# Patient Record
Sex: FEMALE | Race: WHITE
[De-identification: names, ages, dates, MRNs, and addresses within clinical notes are randomized per-mention and may not be internally consistent; named-entity substitution may affect disease eponyms.]

---

## 2022-09-05 ENCOUNTER — HOSPITAL ENCOUNTER (EMERGENCY)
Dept: HOSPITAL 33 - ED | Age: 67
Discharge: HOME | End: 2022-09-05
Payer: COMMERCIAL

## 2022-09-05 VITALS — DIASTOLIC BLOOD PRESSURE: 59 MMHG | OXYGEN SATURATION: 97 % | HEART RATE: 81 BPM | SYSTOLIC BLOOD PRESSURE: 133 MMHG

## 2022-09-05 DIAGNOSIS — E11.9: ICD-10-CM

## 2022-09-05 DIAGNOSIS — N30.00: Primary | ICD-10-CM

## 2022-09-05 DIAGNOSIS — R10.30: ICD-10-CM

## 2022-09-05 LAB
ALBUMIN SERPL-MCNC: 4 G/DL (ref 3.5–5)
ALP SERPL-CCNC: 89 U/L (ref 38–126)
ALT SERPL-CCNC: 18 U/L (ref 0–35)
ANION GAP SERPL CALC-SCNC: 14.8 MEQ/L (ref 5–15)
AST SERPL QL: 26 U/L (ref 14–36)
BILIRUB BLD-MCNC: 1.3 MG/DL (ref 0.2–1.3)
BUN SERPL-MCNC: 13 MG/DL (ref 7–17)
CALCIUM SPEC-MCNC: 8.8 MG/DL (ref 8.4–10.2)
CELLS COUNTED: 100
CHLORIDE SERPL-SCNC: 98 MMOL/L (ref 98–107)
CO2 SERPL-SCNC: 25 MMOL/L (ref 22–30)
CREAT SERPL-MCNC: 0.55 MG/DL (ref 0.52–1.04)
GFR SERPLBLD BASED ON 1.73 SQ M-ARVRAT: > 60 ML/MIN
GLUCOSE SERPL-MCNC: 233 MG/DL (ref 74–106)
GLUCOSE UR-MCNC: NEGATIVE MG/DL
HCT VFR BLD AUTO: 38.6 % (ref 35–47)
HGB BLD-MCNC: 12.3 G/DL (ref 12–16)
MANUAL DIF COMMENT BLD-IMP: NORMAL
MCH RBC QN AUTO: 26.9 PG (ref 26–32)
MCHC RBC AUTO-ENTMCNC: 31.9 G/DL (ref 32–36)
PLATELET # BLD AUTO: 355 X10^3/UL (ref 150–450)
POTASSIUM SERPLBLD-SCNC: 3.6 MMOL/L (ref 3.5–5.1)
PROT SERPL-MCNC: 6.8 G/DL (ref 6.3–8.2)
PROT UR STRIP-MCNC: 100 MG/DL
RBC # BLD AUTO: 4.57 X10^6/UL (ref 4.1–5.4)
RBC # UR AUTO: (no result) ERY/UL (ref 0–5)
RBC #/AREA URNS HPF: >101 /HPF (ref 0–2)
SODIUM SERPL-SCNC: 134 MMOL/L (ref 137–145)
UA DIPSTICK PNL UR: (no result)
URINE CULTURED INDICATED?: YES
WBC # BLD AUTO: 11.5 X10^3/UL (ref 4–10.5)
WBC #/AREA URNS HPF: (no result) /HPF (ref 0–5)

## 2022-09-05 PROCEDURE — 85025 COMPLETE CBC W/AUTO DIFF WBC: CPT

## 2022-09-05 PROCEDURE — 81015 MICROSCOPIC EXAM OF URINE: CPT

## 2022-09-05 PROCEDURE — 80053 COMPREHEN METABOLIC PANEL: CPT

## 2022-09-05 PROCEDURE — 87186 SC STD MICRODIL/AGAR DIL: CPT

## 2022-09-05 PROCEDURE — 36415 COLL VENOUS BLD VENIPUNCTURE: CPT

## 2022-09-05 PROCEDURE — P9612 CATHETERIZE FOR URINE SPEC: HCPCS

## 2022-09-05 PROCEDURE — 87077 CULTURE AEROBIC IDENTIFY: CPT

## 2022-09-05 PROCEDURE — 99283 EMERGENCY DEPT VISIT LOW MDM: CPT

## 2022-09-05 PROCEDURE — 87086 URINE CULTURE/COLONY COUNT: CPT

## 2022-09-05 NOTE — ERPHSYRPT
- History of Present Illness


Time Seen by Provider: 09/05/22 14:15


Source: patient


Exam Limitations: no limitations


Patient Subjective Stated Complaint: pt here for urinary rention, she has not 

void since last night, she teeth removed last week , some nasuea and vomiting l

ast night ,non today, able to keep fluids down


Triage Nursing Assessment: pt alert, resp easy, skin w/d/p, abd distended, no 

edema noted, face mask in place


Physician History: 





67 years old female with history of diabetes mellitus presented to the ER with 

difficulty urination since last night.  Patient reports she is having fullness 

pressure in the suprapubic area and is unable to urinate despite drinking.  She 

also report having nausea and an episode of vomiting yesterday but no nausea 

vomiting or upper abdominal pain at all.  No fever or chills reported.


Timing/Duration: today, gradual onset


Quality: fullness, pressure


Onset Location: suprapubic


Pain Radiation: none


Severity of Pain-Max: mild


Severity of Pain-Current: mild


Sexual intercourse history: non-contributory


Modifying Factors: Improves With: nothing


Allergies/Adverse Reactions: 








Penicillins Allergy (Verified 09/05/22 14:32)


   





Hx Influenza Vaccination/Date Given: Yes


Hx Pneumococcal Vaccination/Date Given: Yes





Travel Risk





- International Travel


Have you traveled outside of the country in past 3 weeks: No





- Coronavirus Screening


Are you exhibiting any of the following symptoms?: No


Close contact with a COVID-19 positive Pt in past 14-21 Days: No





- Vaccine Status


Have you recieved a Covid-19 vaccination: Yes


: Unknown





- Vaccination Dates


Date of 2cond Vaccination (if applicable): 2021


Dates if Unknown: ?





- Review of Systems


Constitutional: No Symptoms


Ears, Nose, & Throat: No Symptoms


Respiratory: No Symptoms


Cardiac: No Symptoms


Abdominal/Gastrointestinal: Abdominal Pain, Nausea


Genitourinary Symptoms: Dysuria, Urinary Retention


Musculoskeletal: No Symptoms


Skin: No Symptoms


Neurological: No Symptoms


Psychological: No Symptoms


Endocrine: No Symptoms


Hematologic/Lymphatic: No Symptoms





- Past Medical History


Pertinent Past Medical History: Yes


Cardiac History: Coronary Artery Disease


Endocrine Medical History: Diabetes Type II





- Past Surgical History


Past Surgical History: Yes


Cardiac: Cardiac Catheterization, Cardiac Stent


Musculoskeletal: Orthopedic Surgery


Other Surgical History: 2 toes amputee





- Social History


Smoking Status: Never smoker


Exposure to second hand smoke: No


Drug Use: none


Patient Lives Alone: No





- Nursing Vital Signs


Nursing Vital Signs: 


                               Initial Vital Signs











Temperature  97.9 F   09/05/22 14:27


 


Pulse Rate  81   09/05/22 14:27


 


Respiratory Rate  18   09/05/22 14:27


 


Blood Pressure  133/59   09/05/22 14:27


 


O2 Sat by Pulse Oximetry  97   09/05/22 14:27








                                   Pain Scale











Pain Intensity                 4

















- Physical Exam


General Appearance: no apparent distress, alert


Eye Exam: PERRL/EOMI


Ears, Nose, Throat Exam: normal ENT inspection


Neck Exam: normal inspection, full range of motion


Respiratory Exam: normal breath sounds, lungs clear


Cardiovascular Exam: regular rate/rhythm, normal heart sounds


Gastrointestinal/Abdomen Exam: soft, normal bowel sounds, No tenderness, No 

distention, No guarding


Back Exam: normal inspection, normal range of motion


Extremity Exam: normal inspection, normal range of motion


Neurologic Exam: alert, oriented x 3, cooperative


Skin Exam: normal color


**SpO2 Interpretation**: normal


SpO2: 97


O2 Delivery: Room Air


Ordered Tests: 


                               Active Orders 24 hr











 Category Date Time Status


 


 CBC W DIFF Stat Lab  09/05/22 15:02 Completed


 


 CMP Stat Lab  09/05/22 15:02 Completed


 


 CULTURE,URINE Stat Lab  09/05/22 15:05 Received


 


 Manual Differential NC Stat Lab  09/05/22 15:02 Completed


 


 UA W/RFX CULTURE Stat Lab  09/05/22 15:05 Completed








Medication Summary














Discontinued Medications














Generic Name Dose Route Start Last Admin





  Trade Name Phoenixq  PRN Reason Stop Dose Admin


 


Levofloxacin  Confirm  09/05/22 17:00 





  Levofloxacin 500 Mg Tablet  Administered  09/05/22 17:01 





  Dose  





  500 mg  





  .ROUTE  





  .STK-MED ONE  


 


Levofloxacin  500 mg  09/05/22 17:01  09/05/22 17:04





  Levofloxacin 250 Mg Tab  PO  09/05/22 17:02  500 mg





  STAT ONE   Administration











Lab/Rad Data: 


                           Laboratory Result Diagrams





                                 09/05/22 15:02 





                                 09/05/22 15:02 





                               Laboratory Results











  09/05/22 09/05/22 09/05/22 Range/Units





  15:05 15:02 15:02 


 


WBC    11.5 H  (4.0-10.5)  x10^3/uL


 


RBC    4.57  (4.1-5.4)  x10^6/uL


 


Hgb    12.3  (12.0-16.0)  g/dL


 


Hct    38.6  (35-47)  %


 


MCV    84.5  ()  fL


 


MCH    26.9  (26-32)  pg


 


MCHC    31.9 L  (32-36)  g/dL


 


RDW    14.4 H  (11.5-14.0)  %


 


Plt Count    355  (150-450)  x10^3/uL


 


MPV    9.6  (7.5-11.0)  fL


 


Sodium   134 L   (137-145)  mmol/L


 


Potassium   3.6   (3.5-5.1)  mmol/L


 


Chloride   98   ()  mmol/L


 


Carbon Dioxide   25   (22-30)  mmol/L


 


Anion Gap   14.8   (5-15)  MEQ/L


 


BUN   13   (7-17)  mg/dL


 


Creatinine   0.55   (0.52-1.04)  mg/dL


 


Estimated GFR   > 60.0   ML/MIN


 


Glucose   233 H   ()  mg/dL


 


Calcium   8.8   (8.4-10.2)  mg/dL


 


Total Bilirubin   1.30   (0.2-1.3)  mg/dL


 


AST   26   (14-36)  U/L


 


ALT   18   (0-35)  U/L


 


Alkaline Phosphatase   89   ()  U/L


 


Serum Total Protein   6.8   (6.3-8.2)  g/dL


 


Albumin   4.0   (3.5-5.0)  g/dL


 


Urinalys Dipstick Clnc  MAIN LAB    


 


Urine Color  LT.YELLOW    (YELLOW)  


 


Urine Appearance  CLOUDY    (CLEAR)  


 


Urine pH  5.5    (5-6)  


 


Ur Specific Gravity  >=1.030    (1.005-1.025)  


 


POC Urine Protein Conf  100    (Negative)  


 


Urine Ketones  TRACE    (NEGATIVE)  


 


Urine Nitrite  POSITIVE    (NEGATIVE)  


 


Urine Bilirubin  SMALL    (NEGATIVE)  


 


Urine Urobilinogen  0.2    (0-1)  mg/dL


 


Urine Leukocytes  TRACE    (NEGATIVE)  


 


Urine WBC (Auto)      (0-5)  /HPF


 


Urine RBC (Auto)  >101    (0-2)  /HPF


 


U Epithel Cells (Auto)  NONE    (FEW)  /HPF


 


Urine Bacteria (Auto)  MANY    (NEGATIVE)  /HPF


 


Urine RBC  LARGE    (0-5)  Yasir/ul


 


Urine Mucus (Auto)  MANY    (NEGATIVE)  /HPF


 


Ur Culture Indicated?  YES    


 


Urine Glucose  NEGATIVE    (NEGATIVE)  mg/dL














- Progress


Progress: improved


Air Movement: good


Progress Note: 





09/05/22 17:13


Patient does not have any urinary retention confirmed with catheter placement.  

She has normal white count, grossly unremarkable chemistries and normal renal 

failure.  Does have UTI and started on antibiotics.  Outpatient follow-up recom

mended.  Do not think she needs imaging or any other work-up and is stable for 

discharge.


09/05/22 17:14





Blood Culture(s) Obtained: No


Antibiotics given: Yes


Counseled pt/family regarding: lab results, diagnosis, need for follow-up





- Departure


Departure Disposition: Home


Clinical Impression: 


 Acute cystitis





Condition: Stable


Critical Care Time: No


Referrals: 


RAMIREZ MOREAU [Primary Care Provider] - Follow up/PCP as directed (1-2 days for 

reevaluation)


Instructions:  Urinary Tract Infection, Adult (DC)


Additional Instructions: 


Take Tylenol as needed for pain.  Drink plenty of fluids to keep yourself well-

hydrated.  Follow-up with primary care for reevaluation.  Return to ER for 

worsening suprapubic discomfort, difficulty urination, fever chills/vomiting 

etc.


Prescriptions: 


Levofloxacin*** [Levaquin 500 MG Tablet***] 500 mg PO DAILY #7 tablet